# Patient Record
Sex: MALE | Race: WHITE | URBAN - METROPOLITAN AREA
[De-identification: names, ages, dates, MRNs, and addresses within clinical notes are randomized per-mention and may not be internally consistent; named-entity substitution may affect disease eponyms.]

---

## 2017-01-09 ENCOUNTER — OFFICE VISIT (OUTPATIENT)
Dept: NEUROSURGERY | Facility: CLINIC | Age: 52
End: 2017-01-09

## 2017-01-09 VITALS
HEART RATE: 50 BPM | BODY MASS INDEX: 28.04 KG/M2 | HEIGHT: 65 IN | SYSTOLIC BLOOD PRESSURE: 109 MMHG | DIASTOLIC BLOOD PRESSURE: 43 MMHG | WEIGHT: 168.3 LBS

## 2017-01-09 DIAGNOSIS — M96.1 FAILED BACK SYNDROME: Primary | ICD-10-CM

## 2017-01-09 DIAGNOSIS — G89.4 CHRONIC PAIN SYNDROME: ICD-10-CM

## 2017-01-09 RX ORDER — OXYCODONE AND ACETAMINOPHEN 5; 325 MG/1; MG/1
2.5 TABLET ORAL EVERY 4 HOURS PRN
COMMUNITY

## 2017-01-09 RX ORDER — VITAMIN E 268 MG
CAPSULE ORAL EVERY MORNING
COMMUNITY

## 2017-01-09 ASSESSMENT — PAIN SCALES - GENERAL: PAINLEVEL: EXTREME PAIN (8)

## 2017-01-09 NOTE — NURSING NOTE
Chief Complaint   Patient presents with     Consult     P NEW - Dicuss pain pump     Cici Davila MA

## 2017-01-09 NOTE — Clinical Note
1/9/2017       RE: Jani Ibarra  [address] [facility] [address]     Dear Colleague,    Thank you for referring your patient, Jani Ibarra, to the [facility]UROS[facility]ERY at [facility]. Please see a copy of my visit note below.      HISTORY AND PHYSICAL EXAM    Chief Complaint   Patient presents with     Consult     Pain pump evaluation; chronic cervical and low back pain       HISTORY OF PRESENT ILLNESS  We saw Mr. Jani Ibarra in Neurosurgery Clinic today. He is a 51 year old male with history of chronic back and cervical pain for over a year and a half. He has had multiple spine surgeries including an anterior-posterior lumbar fusion with Dr. Richi Ramsey on 07/14/2016. He also has chronic cervical pain, which he gets Toradol injections regularly and takes oxycodone for pain relief. He continues to have debilitating pain that effects his everyday life and is here to discuss an IT pain pump. Today, he reports his oxycodone anymore. He is taking 10 tablets a day. His pain currently in his neck and his back. His back pain worsens with walking. He rates his pain at a constant 8/10. He lists a cervical fusion in 2012, cervical decompression in 2013, another cervical decompression in 2014, and his most recent lumbar fusion in 2016. He had a stimulator trial in the past for his neck, but was unable to access his spine with the electrode. He currently lives in a group home and has been trying to walk as much as he can. He has history of migraines, which he receives botox injections for by a neurologist. He believes he had a neuropsychological evaluation in the past in [place], but does not have the report with him.      History reviewed. No pertinent past medical history.    History reviewed. No pertinent past surgical history.    History reviewed. No pertinent family history.    Social History     Social History     Marital Status: Single     Spouse Name: N/A     Number of  Children: N/A     Years of Education: N/A     Occupational History     Not on file.     Social History Main Topics     Smoking status: Current Every Day Smoker     Smokeless tobacco: Not on file     Alcohol Use: Not on file     Drug Use: Not on file     Sexual Activity: Not on file     Other Topics Concern     Not on file     Social History Narrative     No narrative on file        No Known Allergies    Current Outpatient Prescriptions   Medication     MELOXICAM PO     MIRTAZAPINE PO     OLANZAPINE PO     OMEPRAZOLE PO     OXYCODONE HCL PO     oxyCODONE-acetaminophen (PERCOCET) 5-325 MG per tablet     PRAMIPEXOLE DIHYDROCHLORIDE PO     PREDNISONE PO     PREGABALIN PO     QUETIAPINE FUMARATE PO     RANITIDINE HCL PO     SIMVASTATIN PO     TAMSULOSIN HCL PO     vitamin E 400 UNIT capsule     No current facility-administered medications for this visit.       REVIEW OF SYSTEMS:  General: POSITIVE for difficulty sleeping, headache, recent fatigue, and recent weight gain. Negative for chills/sweats/fever.  Eyes: POSITIVE for double vision. Negative for blurred vision, crossed eyes, recent eye infections, vision flashes, or vision halos.  Ears/Nose/Mouth/Throat: Negative for bleeding gums, difficulty swallowing, earache, ear discharge, hearing loss, hoarseness, nosebleeds, tinnitus, or sinus problems.  Respiratory: POSITIVE for shortness of breath. Negative for chronic cough, coughing blood, night sweats, Tuberculosis, or wheezing.  Cardiovascular: POSITIVE for poor circulation. Negative for chest pain, dyspnea at night, heart murmur, palpitations, pacemaker, pacemaker, poor circulation, swollen legs/feet, or varicose veins.  Gastrointestinal: Negative for melena, hematochezia, chronic diarrhea, heartburn, Hepatitis A/B/C, increasing constipation, Liver Disease, nausea, or vomiting.   Genitourinary: Negative for Urinary retention, genital discharge, urinary incontinence, prostate problems, urgency, or UTI.   Neurological:  "POSITIVE for headaches, numbness and tingling in hands/arms/legs. Negative for syncope, memory problems, or seizures.  Psychological: POSITIVE for anxiety, depression, or restlessness.  Skin: Negative for chronic skin itching, color changes in hand/feet when cold, poor scarring, non-healing ulcers, skin rashes/hives, unusual moles.  Musculoskeletal: POSITIVE for arthritis and osteoporosis. Negative for joint swelling in hands/wrists/hips/knees/joints, muscle tenderness in arms/legs.  Endocrine: Negative for excessive thirst/hunger, intolerance for warm rooms, loss of libido, multiple broken bones, rapid weight gain/loss, galactorrhea, or thyroid issues.  Hematologic/Lymphatic: Negative for easy skin bruising, significant fatigue, prolonged bleeding, tender glands/lymph nodes.  Allergies: Negative for asthma or hay fever.    PHYSICAL EXAM  /43 mmHg  Pulse 50  Ht 1.651 m (5' 5\")  Wt 76.34 kg (168 lb 4.8 oz)  BMI 28.01 kg/m2      General: Awake, alert, oriented. Well nourished, well developed, he is not in any acute distress.  HEENT: Head normocephalic, atraumatic. No carotid bruit. Neck supple. Good range of motion. No palpable thyroid mass.  Heart: Regular rhythm and rate. No murmurs.  Lungs: Clear to auscultation and percussion bilaterally. No ronchi, rales or wheeze.  Abdomen: Soft, non-tender, non-distended. No hepatosplenomegaly.  Extremity: Warm with no clubbing or cyanosis. No lower extremity edema.  Incisions: Lumbar spine midline incision from L1 to S1 well healed.     Neurological  Awake, alert and oriented to date, time, place and person. Speech fluent.   Pupils equal, round, reactive to light.  Extraocular movement intact.  Visual fields are full on confrontation.  Hearing is grossly normal to finger rub.   Facial sensation intact.  Face symmetric.  Tongue midline.  Uvula elevates equally.    Motor: full strength throughout.  Sensation: intact to light touch and pinpoint.  Deep tendon reflexes: " 3+ throughout. Negative for clonus. Negative for Christy's sign. No dysmetria.      ASSESSMENT   51 year old male with history of chronic cervical and lumbar pain. S/p multiple cervical and lumbar spine surgeries.     During today's visit, we discussed both spinal cord stimulator and intrathecal drug delivery system as options for pain management. First, we breifly the spinal cord stimulator implantation surgery. However, he has already had an unsuccessful trial for this and is not a good candidate. Second, we extensively discussed that the intrathecal drug delivery system may be another option for pain management. I explained both phase I and II of the surgery for implanting the IT drug delivery system. The test trial would administer *** to ensure that the patient does not have any adverse side effects to this medication. If he has a successful test trial, he would then return for implantation of a permanent IT drug delivery system.  Risks, benefits, alternative therapies were discussed with the patient, including but not limited to infection and bleeding. All questions were answered, and he expressed understanding.     I have provided him information on both of these surgical interventions for him to review after today's visit. He will return in about 4 weeks to further discuss the pain management options.    A full history and physical exam was performed during today's visit. Regardless of the surgical intervention that the patient pursues, he will need to have a neuropsych evaluation.       PLAN:  1. We will obtain his neuropsychological report for us.  2. We will obtain his imaging of his cervical and lumbar spine from [facility].   3. We will see him back in clinic in *** weeks to finalize surgical recommendations.     I, Solange Sousa, am serving as a scribe to document services personally performed by Cisco Benson MD, PhD, based upon my observations and the provider's statements to me. All  documentation has been reviewed and edited by the aforementioned doctor prior to being entered into the official medical record.    I, Cisco Benson, attest that above named individual is acting in scribe capacity, has observed my performance of the services and has documented them in accordance with my direction. The documentation recorded by the scribe accurately reflects the service I personally performed and the decisions made by me. The document was also partially recorded by me and the entire document was edited by me as well.       Again, thank you for allowing me to participate in the care of your patient.      Sincerely,    Cisco Benson MD

## 2017-01-09 NOTE — LETTER
1/9/2017      RE: Jani Ibarra  [address] [facility] [address]         HISTORY AND PHYSICAL EXAM    Chief Complaint   Patient presents with     Consult     Pain pump evaluation; chronic cervical and low back pain       HISTORY OF PRESENT ILLNESS  We saw Mr. Jani Ibarra in Neurosurgery Clinic today. He is a 51 year old male with history of chronic back and cervical pain for over a year and a half. He has had multiple spine surgeries including an anterior-posterior lumbar fusion with Dr. Richi Ramsey on 07/14/2016. He also has chronic cervical pain, which he gets Toradol injections regularly and takes oxycodone for pain relief. He continues to have debilitating pain that effects his everyday life and is here to discuss an IT pain pump. Today, he reports his oxycodone anymore. He is taking 10 tablets a day. His pain currently in his neck and his back. His back pain worsens with walking. He rates his pain at a constant 8/10. He lists a cervical fusion in 2012, cervical decompression in 2013, another cervical decompression in 2014, and his most recent lumbar fusion in 2016. He had a stimulator trial in the past for his neck, but was unable to access his spine with the electrode. He currently lives in a group home and has been trying to walk as much as he can. He has history of migraines, which he receives botox injections for by a neurologist. He believes he had a neuropsychological evaluation in the past in [place], but does not have the report with him.      History reviewed. No pertinent past medical history.    History reviewed. No pertinent past surgical history.    History reviewed. No pertinent family history.    Social History     Social History     Marital Status: Single     Spouse Name: N/A     Number of Children: N/A     Years of Education: N/A     Occupational History     Not on file.     Social History Main Topics     Smoking status: Current Every Day Smoker     Smokeless tobacco: Not on file      Alcohol Use: Not on file     Drug Use: Not on file     Sexual Activity: Not on file     Other Topics Concern     Not on file     Social History Narrative     No narrative on file        No Known Allergies    Current Outpatient Prescriptions   Medication     MELOXICAM PO     MIRTAZAPINE PO     OLANZAPINE PO     OMEPRAZOLE PO     OXYCODONE HCL PO     oxyCODONE-acetaminophen (PERCOCET) 5-325 MG per tablet     PRAMIPEXOLE DIHYDROCHLORIDE PO     PREDNISONE PO     PREGABALIN PO     QUETIAPINE FUMARATE PO     RANITIDINE HCL PO     SIMVASTATIN PO     TAMSULOSIN HCL PO     vitamin E 400 UNIT capsule     No current facility-administered medications for this visit.       REVIEW OF SYSTEMS:  General: POSITIVE for difficulty sleeping, headache, recent fatigue, and recent weight gain. Negative for chills/sweats/fever.  Eyes: POSITIVE for double vision. Negative for blurred vision, crossed eyes, recent eye infections, vision flashes, or vision halos.  Ears/Nose/Mouth/Throat: Negative for bleeding gums, difficulty swallowing, earache, ear discharge, hearing loss, hoarseness, nosebleeds, tinnitus, or sinus problems.  Respiratory: POSITIVE for shortness of breath. Negative for chronic cough, coughing blood, night sweats, Tuberculosis, or wheezing.  Cardiovascular: POSITIVE for poor circulation. Negative for chest pain, dyspnea at night, heart murmur, palpitations, pacemaker, pacemaker, poor circulation, swollen legs/feet, or varicose veins.  Gastrointestinal: Negative for melena, hematochezia, chronic diarrhea, heartburn, Hepatitis A/B/C, increasing constipation, Liver Disease, nausea, or vomiting.   Genitourinary: Negative for Urinary retention, genital discharge, urinary incontinence, prostate problems, urgency, or UTI.   Neurological: POSITIVE for headaches, numbness and tingling in hands/arms/legs. Negative for syncope, memory problems, or seizures.  Psychological: POSITIVE for anxiety, depression, or restlessness.  Skin:  "Negative for chronic skin itching, color changes in hand/feet when cold, poor scarring, non-healing ulcers, skin rashes/hives, unusual moles.  Musculoskeletal: POSITIVE for arthritis and osteoporosis. Negative for joint swelling in hands/wrists/hips/knees/joints, muscle tenderness in arms/legs.  Endocrine: Negative for excessive thirst/hunger, intolerance for warm rooms, loss of libido, multiple broken bones, rapid weight gain/loss, galactorrhea, or thyroid issues.  Hematologic/Lymphatic: Negative for easy skin bruising, significant fatigue, prolonged bleeding, tender glands/lymph nodes.  Allergies: Negative for asthma or hay fever.    PHYSICAL EXAM  /43 mmHg  Pulse 50  Ht 1.651 m (5' 5\")  Wt 76.34 kg (168 lb 4.8 oz)  BMI 28.01 kg/m2      General: Awake, alert, oriented. Well nourished, well developed, he is not in any acute distress.  HEENT: Head normocephalic, atraumatic. No carotid bruit. Neck supple. Good range of motion. No palpable thyroid mass.  Heart: Regular rhythm and rate. No murmurs.  Lungs: Clear to auscultation and percussion bilaterally. No ronchi, rales or wheeze.  Abdomen: Soft, non-tender, non-distended. No hepatosplenomegaly.  Extremity: Warm with no clubbing or cyanosis. No lower extremity edema.  Incisions: Lumbar spine midline incision from L1 to S1 well healed.     Neurological  Awake, alert and oriented to date, time, place and person. Speech fluent.   Pupils equal, round, reactive to light.  Extraocular movement intact.  Visual fields are full on confrontation.  Hearing is grossly normal to finger rub.   Facial sensation intact.  Face symmetric.  Tongue midline.  Uvula elevates equally.    Motor: full strength throughout.  Sensation: intact to light touch and pinpoint.  Deep tendon reflexes: 3+ throughout. Negative for clonus. Negative for Christy's sign. No dysmetria.      ASSESSMENT   51 year old male with history of chronic cervical and lumbar pain. S/p multiple cervical and " lumbar spine surgeries.     During today's visit, we discussed both spinal cord stimulator and intrathecal drug delivery system as options for pain management. First, we breifly the spinal cord stimulator implantation surgery. However, he has already had an unsuccessful trial for this and is not a good candidate. Second, we extensively discussed that the intrathecal drug delivery system may be another option for pain management. I explained both phase I and II of the surgery for implanting the IT drug delivery system. The test trial would administer *** to ensure that the patient does not have any adverse side effects to this medication. If he has a successful test trial, he would then return for implantation of a permanent IT drug delivery system.  Risks, benefits, alternative therapies were discussed with the patient, including but not limited to infection and bleeding. All questions were answered, and he expressed understanding.     I have provided him information on both of these surgical interventions for him to review after today's visit. He will return in about 4 weeks to further discuss the pain management options.    A full history and physical exam was performed during today's visit. Regardless of the surgical intervention that the patient pursues, he will need to have a neuropsych evaluation.       PLAN:  1. We will obtain his neuropsychological report for us.  2. We will obtain his imaging of his cervical and lumbar spine from [facility].   3. We will see him back in clinic in *** weeks to finalize surgical recommendations.     I, Solange Sousa, am serving as a scribe to document services personally performed by Cisco Benson MD, PhD, based upon my observations and the provider's statements to me. All documentation has been reviewed and edited by the aforementioned doctor prior to being entered into the official medical record.    I, Cisco Benson, attest that above named individual is acting in  scribe capacity, has observed my performance of the services and has documented them in accordance with my direction. The documentation recorded by the scribe accurately reflects the service I personally performed and the decisions made by me. The document was also partially recorded by me and the entire document was edited by me as well.       Cisco Benson MD

## 2017-01-09 NOTE — MR AVS SNAPSHOT
"              After Visit Summary   2017    Jani Ibarra    MRN: [id number]           Patient Information     Date Of Birth          1965        Visit Information        Provider Department      2017 3:00 PM Cisco Benson MD [facility] Neurosurgery        Today's Diagnoses     Failed back syndrome    -  1    Chronic pain syndrome           Follow-ups after your visit        Who to contact     Please call your clinic at [phone] to:    Ask questions about your health    Make or cancel appointments    Discuss your medicines    Learn about your test results    Speak to your doctor   If you have compliments or concerns about an experience at your clinic, or if you wish to file a complaint, please contact [facility] Patient Relations at [phone] or email us at [email or web address]         Additional Information About Your Visit        MyChart Information     [employer] is an electronic gateway that provides easy, online access to your medical records. With [employer], you can request a clinic appointment, read your test results, renew a prescription or communicate with your care team.     To sign up for [employer] visit the website at [email or web address]   You will be asked to enter the access code listed below, as well as some personal information. Please follow the directions to create your username and password.     Your access code is: [email or web address]  Expires: 2017 11:15 AM     Your access code will  in 90 days. If you need help or a new code, please contact your [facility] or call [phone] for assistance.        Care EveryWhere ID     This is your Care EveryWhere ID. This could be used by other organizations to access your [facility] medical records  [id number]        Your Vitals Were     Pulse Height BMI (Body Mass Index)             50 1.651 m (5' 5\") 28.01 kg/m2          Blood Pressure from Last 3 " Encounters:   03/13/17 (!) 177/97   01/09/17 109/43    Weight from Last 3 Encounters:   03/13/17 81 kg (178 lb 9.6 oz)   01/09/17 76.3 kg (168 lb 4.8 oz)              Today, you had the following     No orders found for display       Primary Care Provider    None Specified       No primary provider on file.        Thank you!     Thank you for choosing [facility]  for your care. Our goal is always to provide you with excellent care. Hearing back from our patients is one way we can continue to improve our services. Please take a few minutes to complete the written survey that you may receive in the mail after your visit with us. Thank you!             Your Updated Medication List - Protect others around you: Learn how to safely use, store and throw away your medicines at www.disposemymeds.org.          This list is accurate as of: 1/9/17 11:59 PM.  Always use your most recent med list.                   Brand Name Dispense Instructions for use    MELOXICAM PO      Take 7.5 mg by mouth 2 times daily       MIRTAZAPINE PO      Take 30 mg by mouth At Bedtime       OLANZAPINE PO      Take 5 mg by mouth At Bedtime       OMEPRAZOLE PO      Take 20 mg by mouth 2 times daily       OXYCODONE HCL PO          oxyCODONE-acetaminophen 5-325 MG per tablet    PERCOCET     Take 2.5 tablets by mouth every 4 hours as needed for moderate to severe pain       PRAMIPEXOLE DIHYDROCHLORIDE PO      Take 0.25 mg by mouth 2 times daily       PREDNISONE PO      Take 10 mg by mouth daily       PREGABALIN PO      Take 300 mg by mouth 2 times daily       QUETIAPINE FUMARATE PO      Take 50 mg by mouth At Bedtime       RANITIDINE HCL PO      Take 150 mg by mouth daily       SIMVASTATIN PO      Take 40 mg by mouth At Bedtime       TAMSULOSIN HCL PO      Take 0.8 mg by mouth daily       vitamin E 400 UNIT capsule      Take by mouth every morning

## 2017-01-09 NOTE — PROGRESS NOTES
HISTORY AND PHYSICAL EXAM    Chief Complaint   Patient presents with     Consult     Pain pump evaluation; chronic cervical and low back pain       HISTORY OF PRESENT ILLNESS  We saw Mr. Jani Ibarra in [facility]surgery [facility] today. He is a 51 year old male with history of chronic back and cervical pain for over a year and a half. He has had multiple spine surgeries including an anterior-posterior lumbar fusion with Dr. Richi Ramsey on 07/14/2016. He also has chronic cervical pain, which he gets Toradol injections regularly and takes oxycodone for pain relief. He continues to have debilitating pain that effects his everyday life and is here to discuss an IT pain pump. Today, he reports his oxycodone anymore. He is taking 10 tablets a day. His pain currently in his neck and his back. His back pain worsens with walking. He rates his pain at a constant 8/10. He lists a cervical fusion in 2012, cervical decompression in 2013, another cervical decompression in 2014, and his most recent lumbar fusion in 2016. He had a stimulator trial in the past for his neck, but was unable to access his spine with the electrode. He currently lives in a group home and has been trying to walk as much as he can. He has history of migraines, which he receives botox injections for by a neurologist. He believes he had a neuropsychological evaluation in the past in [place], but does not have the report with him.      Past Medical History:   Diagnosis Date     Chronic pain syndrome     multiple spine surgeries     Depressive disorder      Iron deficiency anemia, unspecified        Past Surgical History:   Procedure Laterality Date     BACK SURGERY  07/14/2016    Anterior-Posterior lumbar fusion with Dr. Richi Ramsey     C CERV SPINE FUSN,ANTER,BELOW C2  2012    ACDF C3 to C7     C LAMINECTOMY,>2 SGMT,CERVICAL  2014    Posterior cervical decompression       History reviewed. No pertinent family history.    Social History     Social History      Marital status: Single     Spouse name: N/A     Number of children: N/A     Years of education: N/A     Occupational History     Not on file.     Social History Main Topics     Smoking status: Current Every Day Smoker     Smokeless tobacco: Not on file     Alcohol use Not on file     Drug use: Not on file     Sexual activity: Not on file     Other Topics Concern     Not on file     Social History Narrative        No Known Allergies    Current Outpatient Prescriptions   Medication     MELOXICAM PO     MIRTAZAPINE PO     OLANZAPINE PO     OMEPRAZOLE PO     OXYCODONE HCL PO     oxyCODONE-acetaminophen (PERCOCET) 5-325 MG per tablet     PRAMIPEXOLE DIHYDROCHLORIDE PO     PREDNISONE PO     PREGABALIN PO     QUETIAPINE FUMARATE PO     RANITIDINE HCL PO     SIMVASTATIN PO     TAMSULOSIN HCL PO     vitamin E 400 UNIT capsule     No current facility-administered medications for this visit.        REVIEW OF SYSTEMS:  General: POSITIVE for difficulty sleeping, headache, recent fatigue, and recent weight gain. Negative for chills/sweats/fever.  Eyes: POSITIVE for double vision. Negative for blurred vision, crossed eyes, recent eye infections, vision flashes, or vision halos.  Ears/Nose/Mouth/Throat: Negative for bleeding gums, difficulty swallowing, earache, ear discharge, hearing loss, hoarseness, nosebleeds, tinnitus, or sinus problems.  Respiratory: POSITIVE for shortness of breath. Negative for chronic cough, coughing blood, night sweats, Tuberculosis, or wheezing.  Cardiovascular: POSITIVE for poor circulation. Negative for chest pain, dyspnea at night, heart murmur, palpitations, pacemaker, pacemaker, poor circulation, swollen legs/feet, or varicose veins.  Gastrointestinal: Negative for melena, hematochezia, chronic diarrhea, heartburn, Hepatitis A/B/C, increasing constipation, Liver Disease, nausea, or vomiting.   Genitourinary: Negative for Urinary retention, genital discharge, urinary incontinence, prostate problems,  "urgency, or UTI.   Neurological: POSITIVE for headaches, numbness and tingling in hands/arms/legs. Negative for syncope, memory problems, or seizures.  Psychological: POSITIVE for anxiety, depression, or restlessness.  Skin: Negative for chronic skin itching, color changes in hand/feet when cold, poor scarring, non-healing ulcers, skin rashes/hives, unusual moles.  Musculoskeletal: POSITIVE for arthritis and osteoporosis. Negative for joint swelling in hands/wrists/hips/knees/joints, muscle tenderness in arms/legs.  Endocrine: Negative for excessive thirst/hunger, intolerance for warm rooms, loss of libido, multiple broken bones, rapid weight gain/loss, galactorrhea, or thyroid issues.  Hematologic/Lymphatic: Negative for easy skin bruising, significant fatigue, prolonged bleeding, tender glands/lymph nodes.  Allergies: Negative for asthma or hay fever.      PHYSICAL EXAM  /43  Pulse 50  Ht 1.651 m (5' 5\")  Wt 76.3 kg (168 lb 4.8 oz)  BMI 28.01 kg/m2    General: Awake, alert, oriented. Well nourished, well developed, he is not in any acute distress.  HEENT: Head normocephalic, atraumatic. No carotid bruit. Neck supple. Good range of motion. No palpable thyroid mass.  Heart: Regular rhythm and rate. No murmurs.  Lungs: Clear to auscultation and percussion bilaterally. No ronchi, rales or wheeze.  Abdomen: Soft, non-tender, non-distended. No hepatosplenomegaly.  Extremity: Warm with no clubbing or cyanosis. No lower extremity edema.  Incisions: Lumbar spine midline incision from L1 to S1 well healed.     Neurological  Awake, alert and oriented to date, time, place and person. Speech fluent.   Pupils equal, round, reactive to light.  Extraocular movement intact.  Visual fields are full on confrontation.  Hearing is grossly normal to finger rub.   Facial sensation intact.  Face symmetric.  Tongue midline.  Uvula elevates equally.    Motor: full strength throughout.  Sensation: intact to light touch and " pinpoint.  Deep tendon reflexes: 3+ throughout. Negative for clonus. Negative for Christy's sign. No dysmetria.      ASSESSMENT   51 year old male with history of chronic cervical and lumbar pain. S/p multiple cervical and lumbar spine surgeries. 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    During today's visit, we discussed both spinal cord stimulator and intrathecal drug delivery system as options for pain management.     First, we breifly the spinal cord stimulator implantation surgery. However, he has already had an unsuccessful trial for this and may not be a good candidate. There was a difficulty in placing the electrode. I do not have a record of this but if attempt was made, I'm not sure if he could undergo another attempt.  Given that he had a posterior cervical decompression and extensive lumbar decompression and fusion, placement of electrode in the cervical spine may not be possible. Placement in the thoracic spine may be ok with thoracic laminectomy.    Second, we extensively discussed that the intrathecal drug delivery system may be another option for pain management. I explained both phase I and II of the surgery for implanting the IT drug delivery system. The test trial would administer morphine usually to ensure that the patient does not have any adverse side effects to this medication. If he has a successful test trial, he would then return for implantation of a permanent IT drug delivery system.  Risks, benefits, alternative therapies were discussed with the patient, including but not limited to infection and bleeding. All questions were answered, and he expressed understanding.     I did express the overall concern regarding pursuing the pump option. He does live near [place]. He also states that he does not have a relationship with a pain management physician who can manage the pump.  This would make the logistics of managing the pain  pump difficult, as it would require frequent and close adjustment and management.    I have provided him information on both of these surgical interventions for him to review after today's visit. He will return in about 4 weeks to further discuss the pain management options.    A full history and physical exam was performed during today's visit. Regardless of the surgical intervention that the patient pursues, he will need to have a neuropsych evaluation.       PLAN:  1. We will obtain his neuropsychological report for us.  2. We will obtain his imaging of his cervical and lumbar spine from [facility].   3. We will see him back in clinic in about 6 weeks to finalize surgical recommendations.     I, Solange Sousa, am serving as a scribe to document services personally performed by Cisco Benson MD, PhD, based upon my observations and the provider's statements to me. All documentation has been reviewed and edited by the aforementioned doctor prior to being entered into the official medical record.    I, Cisco Benson, attest that above named individual is acting in scribe capacity, has observed my performance of the services and has documented them in accordance with my direction. The documentation recorded by the scribe accurately reflects the service I personally performed and the decisions made by me. The document was also partially recorded by me and the entire document was edited by me as well.

## 2017-01-09 NOTE — LETTER
1/9/2017       RE: Jani Ibarra  [address] [facility] [address]     Dear Colleague,    Thank you for referring your patient, Jani Ibarra, to the [facility] NEUROSURGERY at [facility]. Please see a copy of my visit note below.      HISTORY AND PHYSICAL EXAM    Chief Complaint   Patient presents with     Consult     Pain pump evaluation; chronic cervical and low back pain       HISTORY OF PRESENT ILLNESS  We saw Mr. Jani Ibarra in Neurosurgery Clinic today. He is a 51 year old male with history of chronic back and cervical pain for over a year and a half. He has had multiple spine surgeries including an anterior-posterior lumbar fusion with Dr. Richi Ramsey on 07/14/2016. He also has chronic cervical pain, which he gets Toradol injections regularly and takes oxycodone for pain relief. He continues to have debilitating pain that effects his everyday life and is here to discuss an IT pain pump. Today, he reports his oxycodone anymore. He is taking 10 tablets a day. His pain currently in his neck and his back. His back pain worsens with walking. He rates his pain at a constant 8/10. He lists a cervical fusion in 2012, cervical decompression in 2013, another cervical decompression in 2014, and his most recent lumbar fusion in 2016. He had a stimulator trial in the past for his neck, but was unable to access his spine with the electrode. He currently lives in a group home and has been trying to walk as much as he can. He has history of migraines, which he receives botox injections for by a neurologist. He believes he had a neuropsychological evaluation in the past in [place], but does not have the report with him.      History reviewed. No pertinent past medical history.    History reviewed. No pertinent past surgical history.    History reviewed. No pertinent family history.    Social History     Social History     Marital Status: Single     Spouse Name: N/A     Number of  Children: N/A     Years of Education: N/A     Occupational History     Not on file.     Social History Main Topics     Smoking status: Current Every Day Smoker     Smokeless tobacco: Not on file     Alcohol Use: Not on file     Drug Use: Not on file     Sexual Activity: Not on file     Other Topics Concern     Not on file     Social History Narrative     No narrative on file        No Known Allergies    Current Outpatient Prescriptions   Medication     MELOXICAM PO     MIRTAZAPINE PO     OLANZAPINE PO     OMEPRAZOLE PO     OXYCODONE HCL PO     oxyCODONE-acetaminophen (PERCOCET) 5-325 MG per tablet     PRAMIPEXOLE DIHYDROCHLORIDE PO     PREDNISONE PO     PREGABALIN PO     QUETIAPINE FUMARATE PO     RANITIDINE HCL PO     SIMVASTATIN PO     TAMSULOSIN HCL PO     vitamin E 400 UNIT capsule     No current facility-administered medications for this visit.       REVIEW OF SYSTEMS:  General: POSITIVE for difficulty sleeping, headache, recent fatigue, and recent weight gain. Negative for chills/sweats/fever.  Eyes: POSITIVE for double vision. Negative for blurred vision, crossed eyes, recent eye infections, vision flashes, or vision halos.  Ears/Nose/Mouth/Throat: Negative for bleeding gums, difficulty swallowing, earache, ear discharge, hearing loss, hoarseness, nosebleeds, tinnitus, or sinus problems.  Respiratory: POSITIVE for shortness of breath. Negative for chronic cough, coughing blood, night sweats, Tuberculosis, or wheezing.  Cardiovascular: POSITIVE for poor circulation. Negative for chest pain, dyspnea at night, heart murmur, palpitations, pacemaker, pacemaker, poor circulation, swollen legs/feet, or varicose veins.  Gastrointestinal: Negative for melena, hematochezia, chronic diarrhea, heartburn, Hepatitis A/B/C, increasing constipation, Liver Disease, nausea, or vomiting.   Genitourinary: Negative for Urinary retention, genital discharge, urinary incontinence, prostate problems, urgency, or UTI.   Neurological:  "POSITIVE for headaches, numbness and tingling in hands/arms/legs. Negative for syncope, memory problems, or seizures.  Psychological: POSITIVE for anxiety, depression, or restlessness.  Skin: Negative for chronic skin itching, color changes in hand/feet when cold, poor scarring, non-healing ulcers, skin rashes/hives, unusual moles.  Musculoskeletal: POSITIVE for arthritis and osteoporosis. Negative for joint swelling in hands/wrists/hips/knees/joints, muscle tenderness in arms/legs.  Endocrine: Negative for excessive thirst/hunger, intolerance for warm rooms, loss of libido, multiple broken bones, rapid weight gain/loss, galactorrhea, or thyroid issues.  Hematologic/Lymphatic: Negative for easy skin bruising, significant fatigue, prolonged bleeding, tender glands/lymph nodes.  Allergies: Negative for asthma or hay fever.    PHYSICAL EXAM  /43 mmHg  Pulse 50  Ht 1.651 m (5' 5\")  Wt 76.34 kg (168 lb 4.8 oz)  BMI 28.01 kg/m2      General: Awake, alert, oriented. Well nourished, well developed, he is not in any acute distress.  HEENT: Head normocephalic, atraumatic. No carotid bruit. Neck supple. Good range of motion. No palpable thyroid mass.  Heart: Regular rhythm and rate. No murmurs.  Lungs: Clear to auscultation and percussion bilaterally. No ronchi, rales or wheeze.  Abdomen: Soft, non-tender, non-distended. No hepatosplenomegaly.  Extremity: Warm with no clubbing or cyanosis. No lower extremity edema.  Incisions: Lumbar spine midline incision from L1 to S1 well healed.     Neurological  Awake, alert and oriented to date, time, place and person. Speech fluent.   Pupils equal, round, reactive to light.  Extraocular movement intact.  Visual fields are full on confrontation.  Hearing is grossly normal to finger rub.   Facial sensation intact.  Face symmetric.  Tongue midline.  Uvula elevates equally.    Motor: full strength throughout.  Sensation: intact to light touch and pinpoint.  Deep tendon reflexes: " 3+ throughout. Negative for clonus. Negative for Christy's sign. No dysmetria.      ASSESSMENT   51 year old male with history of chronic cervical and lumbar pain. S/p multiple cervical and lumbar spine surgeries.     During today's visit, we discussed both spinal cord stimulator and intrathecal drug delivery system as options for pain management. First, we breifly the spinal cord stimulator implantation surgery. However, he has already had an unsuccessful trial for this and is not a good candidate. Second, we extensively discussed that the intrathecal drug delivery system may be another option for pain management. I explained both phase I and II of the surgery for implanting the IT drug delivery system. The test trial would administer *** to ensure that the patient does not have any adverse side effects to this medication. If he has a successful test trial, he would then return for implantation of a permanent IT drug delivery system.  Risks, benefits, alternative therapies were discussed with the patient, including but not limited to infection and bleeding. All questions were answered, and he expressed understanding.     I have provided him information on both of these surgical interventions for him to review after today's visit. He will return in about 4 weeks to further discuss the pain management options.    A full history and physical exam was performed during today's visit. Regardless of the surgical intervention that the patient pursues, he will need to have a neuropsych evaluation.       PLAN:  1. We will obtain his neuropsychological report for us.  2. We will obtain his imaging of his cervical and lumbar spine from [facility].   3. We will see him back in clinic in *** weeks to finalize surgical recommendations.     I, Solange Sousa, am serving as a scribe to document services personally performed by Cisco Benson MD, PhD, based upon my observations and the provider's statements to me. All  documentation has been reviewed and edited by the aforementioned doctor prior to being entered into the official medical record.    I, Cisco Benson, attest that above named individual is acting in scribe capacity, has observed my performance of the services and has documented them in accordance with my direction. The documentation recorded by the scribe accurately reflects the service I personally performed and the decisions made by me. The document was also partially recorded by me and the entire document was edited by me as well.       Again, thank you for allowing me to participate in the care of your patient.      Sincerely,    Cisco Benson MD

## 2017-01-19 ENCOUNTER — TRANSFERRED RECORDS (OUTPATIENT)
Dept: HEALTH INFORMATION MANAGEMENT | Facility: CLINIC | Age: 52
End: 2017-01-19

## 2017-02-03 ENCOUNTER — TELEPHONE (OUTPATIENT)
Dept: NEUROSURGERY | Facility: CLINIC | Age: 52
End: 2017-02-03

## 2017-02-03 NOTE — TELEPHONE ENCOUNTER
Pt called b/c he wants to schedule f/u with Dr. Benson to discuss pain pump implant. I asked him if he had signed a release of information so we can get his psychological evaluation from [facility]. He has not. I explained that we need this to move forward. He said he would go there on Monday to sign the release. I gave him our clinic address and fax number so the records can be sent over. No further questions at this time.

## 2017-03-13 ENCOUNTER — OFFICE VISIT (OUTPATIENT)
Dept: NEUROSURGERY | Facility: CLINIC | Age: 52
End: 2017-03-13

## 2017-03-13 VITALS
HEART RATE: 61 BPM | SYSTOLIC BLOOD PRESSURE: 177 MMHG | BODY MASS INDEX: 29.76 KG/M2 | HEIGHT: 65 IN | DIASTOLIC BLOOD PRESSURE: 97 MMHG | WEIGHT: 178.6 LBS

## 2017-03-13 DIAGNOSIS — M54.6 CHRONIC BILATERAL THORACIC BACK PAIN: ICD-10-CM

## 2017-03-13 DIAGNOSIS — M54.2 CERVICALGIA: Primary | ICD-10-CM

## 2017-03-13 DIAGNOSIS — G89.29 CHRONIC BILATERAL THORACIC BACK PAIN: ICD-10-CM

## 2017-03-13 ASSESSMENT — PAIN SCALES - GENERAL: PAINLEVEL: EXTREME PAIN (8)

## 2017-03-13 NOTE — LETTER
3/13/2017       RE: Jani Ibarra  [address] [place] [id number]     Dear Colleague,    Thank you for referring your patient, Jani Ibarra, to the [facility] NEUROSURGERY at [facility]. Please see a copy of my visit note below.    HISTORY AND PHYSICAL EXAM    Chief Complaint   Patient presents with     RECHECK     Discuss Pain Pump; chronic cervical and back pain       HISTORY OF PRESENT ILLNESS  We saw Mr. Jani Ibarra back in Neurosurgery Clinic today to discuss his pain. He is a 51 year old male with history of chronic back and cervical pain for over a year and a half. He has had multiple spine surgeries including an anterior-posterior lumbar fusion with Dr. Richi Ramsey on 07/14/2016. He also has chronic cervical pain, which he gets Toradol injections regularly and takes oxycodone for pain relief. He continues to have debilitating pain that affects his everyday life and is here to discuss an IT pain pump. He lists a cervical fusion in 2012, cervical decompression in 2013, another cervical decompression in 2014, and his most recent lumbar fusion in 2016. He had a stimulator trial in the past for his neck, but was unable to access his spine with the electrode. He is back to discuss his surgical options. Today, he is visibly in pain and not doing well. His primary care physician is currently the one prescribing him pain medication. He does have an appointment with Dr. Ramsey next month. He also has chronic neuropathy in his hands and feet. His legs and arms are numb, making it extremely difficult for him to be active. He has chronic insomnia, where he only gets 2-3 hours of sleep a night.         Past Medical History:   Diagnosis Date     Chronic pain syndrome     multiple spine surgeries     Depressive disorder      Iron deficiency anemia, unspecified        Past Surgical History:   Procedure Laterality Date     BACK SURGERY  07/14/2016    Anterior-Posterior lumbar fusion  with Dr. Richi Ramsey     C CERV SPINE FUSN,ANTER,BELOW C2  2012    ACDF C3 to C7     C LAMINECTOMY,>2 SGMT,CERVICAL  2014    Posterior cervical decompression       History reviewed. No pertinent family history.    Social History     Social History     Marital status: Single     Spouse name: N/A     Number of children: N/A     Years of education: N/A     Occupational History     Not on file.     Social History Main Topics     Smoking status: Current Every Day Smoker     Smokeless tobacco: Not on file     Alcohol use Not on file     Drug use: Not on file     Sexual activity: Not on file     Other Topics Concern     Not on file     Social History Narrative        No Known Allergies    Current Outpatient Prescriptions   Medication     MELOXICAM PO     MIRTAZAPINE PO     OLANZAPINE PO     OMEPRAZOLE PO     OXYCODONE HCL PO     oxyCODONE-acetaminophen (PERCOCET) 5-325 MG per tablet     PRAMIPEXOLE DIHYDROCHLORIDE PO     PREDNISONE PO     PREGABALIN PO     QUETIAPINE FUMARATE PO     RANITIDINE HCL PO     SIMVASTATIN PO     TAMSULOSIN HCL PO     vitamin E 400 UNIT capsule     No current facility-administered medications for this visit.        REVIEW OF SYSTEMS:  General: POSITIVE for difficulty sleeping, headache, recent fatigue, and recent weight gain. Negative for chills/sweats/fever.  Eyes: POSITIVE for double vision. Negative for blurred vision, crossed eyes, recent eye infections, vision flashes, or vision halos.  Ears/Nose/Mouth/Throat: Negative for bleeding gums, difficulty swallowing, earache, ear discharge, hearing loss, hoarseness, nosebleeds, tinnitus, or sinus problems.  Respiratory: POSITIVE for shortness of breath. Negative for chronic cough, coughing blood, night sweats, Tuberculosis, or wheezing.  Cardiovascular: POSITIVE for poor circulation. Negative for chest pain, dyspnea at night, heart murmur, palpitations, pacemaker, pacemaker, poor circulation, swollen legs/feet, or varicose  "veins.  Gastrointestinal: Negative for melena, hematochezia, chronic diarrhea, heartburn, Hepatitis A/B/C, increasing constipation, Liver Disease, nausea, or vomiting.   Genitourinary: Negative for Urinary retention, genital discharge, urinary incontinence, prostate problems, urgency, or UTI.   Neurological: POSITIVE for headaches, numbness and tingling in hands/arms/legs. Negative for syncope, memory problems, or seizures.  Psychological: POSITIVE for anxiety, depression, or restlessness.  Skin: Negative for chronic skin itching, color changes in hand/feet when cold, poor scarring, non-healing ulcers, skin rashes/hives, unusual moles.  Musculoskeletal: POSITIVE for arthritis and osteoporosis. Negative for joint swelling in hands/wrists/hips/knees/joints, muscle tenderness in arms/legs.  Endocrine: Negative for excessive thirst/hunger, intolerance for warm rooms, loss of libido, multiple broken bones, rapid weight gain/loss, galactorrhea, or thyroid issues.  Hematologic/Lymphatic: Negative for easy skin bruising, significant fatigue, prolonged bleeding, tender glands/lymph nodes.  Allergies: Negative for asthma or hay fever.  PHYSICAL EXAM  BP (!) 177/97 (BP Location: Left arm, Patient Position: Chair, Cuff Size: Adult Large)  Pulse 61  Ht 1.651 m (5' 5\")  Wt 81 kg (178 lb 9.6 oz)  BMI 29.72 kg/m2    General: Awake, alert, oriented. Well nourished, well developed, he is not in any acute distress.  Incisions: Lumbar spine midline incision from L1 to S1 well healed.    Neurological  Awake, alert and oriented to date, time, place and person. Speech fluent.   Pupils equal, round, reactive to light.  Extraocular movement intact.  Facial sensation intact.  Face symmetric.  Moves all extremities well.  Sensation grossly intact.    ASSESSMENT  51 year old male with history of chronic cervical and lumbar pain. S/p multiple cervical and lumbar spine surgeries.    He returns to discuss his options further. We do not have " "his neuropsychological report and although he had his imaging done, we do not have it to review.    We again discussed his option of the intrathecal pump placement.  I again expressed my concerns for this option.  The main concern is that he lives near the [facility]. If we were to place a pump, his management would be difficult, as he would have to make frequent trips down to [place] for adjustments and refills.  Although he states that he is willing to do so, I question the feasibility and the limited options of days that he can be seen.    I also discussed the overall pain management service issues.  Unless he has a pain management physician or service who is willing to manage his pump and his medication, I told him that it would be difficult for me to take him on as a patient to continue to manage. I explained to him the differences in the role of an implanting surgeon versus a pain management physician.  I also explained that I do have some patients that I manage but that is due to the lack of pain management service at this time who can take over the management.  Adding to the fact that he would be a \"long distance\" patient, I explained that it would not be a good idea or a safe idea to go forward with the implantation.    I explained that if he is able to establish a relationship with a pain management physician close to his home and he requires the surgical services of implanting the pump, I could be available for this.      PLAN  1. MRI of the cervical and thoracic spine - either obtain or get the images.  2. We will await for neuropsych report.  3. Awaiting other medical records.  4. Patient will let us know if he has established a relationship with a pain management physician near his home who is willing to manage a intrathecal pump.    I, Solange Sousa, am serving as a scribe to document services personally performed by Cisco Benson MD, PhD, based upon my observations and the provider's " statements to me. All documentation has been reviewed and edited by the aforementioned doctor prior to being entered into the official medical record.    I, Cisco Benson, attest that above named individual is acting in scribe capacity, has observed my performance of the services and has documented them in accordance with my direction. The documentation recorded by the scribe accurately reflects the service I personally performed and the decisions made by me. The document was also partially recorded by me and the entire document was edited by me as well.         Again, thank you for allowing me to participate in the care of your patient.      Sincerely,    Cisco Benson MD

## 2017-03-13 NOTE — PROGRESS NOTES
HISTORY AND PHYSICAL EXAM    Chief Complaint   Patient presents with     RECHECK     Discuss Pain Pump; chronic cervical and back pain       HISTORY OF PRESENT ILLNESS  We saw Mr. Jani Ibarra back in Neurosurgery Clinic today to discuss his pain. He is a 51 year old male with history of chronic back and cervical pain for over a year and a half. He has had multiple spine surgeries including an anterior-posterior lumbar fusion with Dr. Richi Ramsey on 07/14/2016. He also has chronic cervical pain, which he gets Toradol injections regularly and takes oxycodone for pain relief. He continues to have debilitating pain that affects his everyday life and is here to discuss an IT pain pump. He lists a cervical fusion in 2012, cervical decompression in 2013, another cervical decompression in 2014, and his most recent lumbar fusion in 2016. He had a stimulator trial in the past for his neck, but was unable to access his spine with the electrode. He is back to discuss his surgical options. Today, he is visibly in pain and not doing well. His primary care physician is currently the one prescribing him pain medication. He does have an appointment with Dr. Ramsey next month. He also has chronic neuropathy in his hands and feet. His legs and arms are numb, making it extremely difficult for him to be active. He has chronic insomnia, where he only gets 2-3 hours of sleep a night.         Past Medical History:   Diagnosis Date     Chronic pain syndrome     multiple spine surgeries     Depressive disorder      Iron deficiency anemia, unspecified        Past Surgical History:   Procedure Laterality Date     BACK SURGERY  07/14/2016    Anterior-Posterior lumbar fusion with Dr. Richi Ramsey     C CERV SPINE FUSN,ANTER,BELOW C2  2012    ACDF C3 to C7     C LAMINECTOMY,>2 SGMT,CERVICAL  2014    Posterior cervical decompression       History reviewed. No pertinent family history.    Social History     Social History     Marital status:  Single     Spouse name: N/A     Number of children: N/A     Years of education: N/A     Occupational History     Not on file.     Social History Main Topics     Smoking status: Current Every Day Smoker     Smokeless tobacco: Not on file     Alcohol use Not on file     Drug use: Not on file     Sexual activity: Not on file     Other Topics Concern     Not on file     Social History Narrative        No Known Allergies    Current Outpatient Prescriptions   Medication     MELOXICAM PO     MIRTAZAPINE PO     OLANZAPINE PO     OMEPRAZOLE PO     OXYCODONE HCL PO     oxyCODONE-acetaminophen (PERCOCET) 5-325 MG per tablet     PRAMIPEXOLE DIHYDROCHLORIDE PO     PREDNISONE PO     PREGABALIN PO     QUETIAPINE FUMARATE PO     RANITIDINE HCL PO     SIMVASTATIN PO     TAMSULOSIN HCL PO     vitamin E 400 UNIT capsule     No current facility-administered medications for this visit.        REVIEW OF SYSTEMS:  General: POSITIVE for difficulty sleeping, headache, recent fatigue, and recent weight gain. Negative for chills/sweats/fever.  Eyes: POSITIVE for double vision. Negative for blurred vision, crossed eyes, recent eye infections, vision flashes, or vision halos.  Ears/Nose/Mouth/Throat: Negative for bleeding gums, difficulty swallowing, earache, ear discharge, hearing loss, hoarseness, nosebleeds, tinnitus, or sinus problems.  Respiratory: POSITIVE for shortness of breath. Negative for chronic cough, coughing blood, night sweats, Tuberculosis, or wheezing.  Cardiovascular: POSITIVE for poor circulation. Negative for chest pain, dyspnea at night, heart murmur, palpitations, pacemaker, pacemaker, poor circulation, swollen legs/feet, or varicose veins.  Gastrointestinal: Negative for melena, hematochezia, chronic diarrhea, heartburn, Hepatitis A/B/C, increasing constipation, Liver Disease, nausea, or vomiting.   Genitourinary: Negative for Urinary retention, genital discharge, urinary incontinence, prostate problems, urgency, or UTI.  "  Neurological: POSITIVE for headaches, numbness and tingling in hands/arms/legs. Negative for syncope, memory problems, or seizures.  Psychological: POSITIVE for anxiety, depression, or restlessness.  Skin: Negative for chronic skin itching, color changes in hand/feet when cold, poor scarring, non-healing ulcers, skin rashes/hives, unusual moles.  Musculoskeletal: POSITIVE for arthritis and osteoporosis. Negative for joint swelling in hands/wrists/hips/knees/joints, muscle tenderness in arms/legs.  Endocrine: Negative for excessive thirst/hunger, intolerance for warm rooms, loss of libido, multiple broken bones, rapid weight gain/loss, galactorrhea, or thyroid issues.  Hematologic/Lymphatic: Negative for easy skin bruising, significant fatigue, prolonged bleeding, tender glands/lymph nodes.  Allergies: Negative for asthma or hay fever.      PHYSICAL EXAM  BP (!) 177/97 (BP Location: Left arm, Patient Position: Chair, Cuff Size: Adult Large)  Pulse 61  Ht 1.651 m (5' 5\")  Wt 81 kg (178 lb 9.6 oz)  BMI 29.72 kg/m2    General: Awake, alert, oriented. Well nourished, well developed, he is not in any acute distress.  Incisions: Lumbar spine midline incision from L1 to S1 well healed.    Neurological  Awake, alert and oriented to date, time, place and person. Speech fluent.   Pupils equal, round, reactive to light.  Extraocular movement intact.  Facial sensation intact.  Face symmetric.  Moves all extremities well.  Sensation grossly intact.      ASSESSMENT  51 year old male with history of chronic cervical and lumbar pain. S/p multiple cervical and lumbar spine surgeries.    He returns to discuss his options further. We do not have his neuropsychological report and although he had his imaging done, we do not have it to review.    We again discussed his option of the intrathecal pump placement.  I again expressed my concerns for this option.  The main concern is that he lives near the [facility]. If we were to " "place a pump, his management would be difficult, as he would have to make frequent trips down to [place] for adjustments and refills.  Although he states that he is willing to do so, I question the feasibility and the limited options of days that he can be seen.    I also discussed the overall pain management service issues.  Unless he has a pain management physician or service who is willing to manage his pump and his medication, I told him that it would be difficult for me to take him on as a patient to continue to manage. I explained to him the differences in the role of an implanting surgeon versus a pain management physician.  I also explained that I do have some patients that I manage but that is due to the lack of pain management service at this time who can take over the management.  Adding to the fact that he would be a \"long distance\" patient, I explained that it would not be a good idea or a safe idea to go forward with the implantation.    I explained that if he is able to establish a relationship with a pain management physician close to his home and he requires the surgical services of implanting the pump, I could be available for this.      PLAN  1. MRI of the cervical and thoracic spine - either obtain or get the images.  2. We will await for neuropsych report.  3. Awaiting other medical records.  4. Patient will let us know if he has established a relationship with a pain management physician near his home who is willing to manage a intrathecal pump.    I, Solange Sousa, am serving as a scribe to document services personally performed by Cisco Benson MD, PhD, based upon my observations and the provider's statements to me. All documentation has been reviewed and edited by the aforementioned doctor prior to being entered into the official medical record.    I, Cisco Benson, attest that above named individual is acting in scribe capacity, has observed my performance of the services and has " documented them in accordance with my direction. The documentation recorded by the scribe accurately reflects the service I personally performed and the decisions made by me. The document was also partially recorded by me and the entire document was edited by me as well.

## 2017-03-13 NOTE — MR AVS SNAPSHOT
"              After Visit Summary   3/13/2017    Jani Ibarra    MRN: [id number]           Patient Information     Date Of Birth          1965        Visit Information        Provider Department      3/13/2017 3:00 PM Cisco Benson MD [facility] Neurosurgery        Today's Diagnoses     Cervicalgia    -  1    Chronic bilateral thoracic back pain           Follow-ups after your visit        Who to contact     Please call your clinic at [phone] to:    Ask questions about your health    Make or cancel appointments    Discuss your medicines    Learn about your test results    Speak to your doctor   If you have compliments or concerns about an experience at your clinic, or if you wish to file a complaint, please contact [facility] Patient Relations at [phone] or email us at [email or web address]         Additional Information About Your Visit        MyChart Information     [employer] is an electronic gateway that provides easy, online access to your medical records. With [employer], you can request a clinic appointment, read your test results, renew a prescription or communicate with your care team.     To sign up for [employer] visit the website at [email or web address]   You will be asked to enter the access code listed below, as well as some personal information. Please follow the directions to create your username and password.     Your access code is: [email or web address]  Expires: 2017 11:15 AM     Your access code will  in 90 days. If you need help or a new code, please contact your [facility] or call [phone] for assistance.        Care EveryWhere ID     This is your Care EveryWhere ID. This could be used by other organizations to access your [place] medical records  [id number]        Your Vitals Were     Pulse Height BMI (Body Mass Index)             61 1.651 m (5' 5\") 29.72 kg/m2          Blood Pressure from Last " 3 Encounters:   03/13/17 (!) 177/97   01/09/17 109/43    Weight from Last 3 Encounters:   03/13/17 81 kg (178 lb 9.6 oz)   01/09/17 76.3 kg (168 lb 4.8 oz)               Primary Care Provider    None Specified       No primary provider on file.        Thank you!     Thank you for choosing [facility]  for your care. Our goal is always to provide you with excellent care. Hearing back from our patients is one way we can continue to improve our services. Please take a few minutes to complete the written survey that you may receive in the mail after your visit with us. Thank you!             Your Updated Medication List - Protect others around you: Learn how to safely use, store and throw away your medicines at www.disposemymeds.org.          This list is accurate as of: 3/13/17 11:59 PM.  Always use your most recent med list.                   Brand Name Dispense Instructions for use    MELOXICAM PO      Take 7.5 mg by mouth 2 times daily       MIRTAZAPINE PO      Take 30 mg by mouth At Bedtime       OLANZAPINE PO      Take 5 mg by mouth At Bedtime       OMEPRAZOLE PO      Take 20 mg by mouth 2 times daily       OXYCODONE HCL PO          oxyCODONE-acetaminophen 5-325 MG per tablet    PERCOCET     Take 2.5 tablets by mouth every 4 hours as needed for moderate to severe pain       PRAMIPEXOLE DIHYDROCHLORIDE PO      Take 0.25 mg by mouth 2 times daily       PREDNISONE PO      Take 10 mg by mouth daily       PREGABALIN PO      Take 300 mg by mouth 2 times daily       QUETIAPINE FUMARATE PO      Take 50 mg by mouth At Bedtime       RANITIDINE HCL PO      Take 150 mg by mouth daily       SIMVASTATIN PO      Take 40 mg by mouth At Bedtime       TAMSULOSIN HCL PO      Take 0.8 mg by mouth daily       vitamin E 400 UNIT capsule      Take by mouth every morning

## 2020-11-02 NOTE — NURSING NOTE
Chief Complaint   Patient presents with     RECHECK     [facility] RETURN - Discuss Pump Pain     Cici Davila MA    
Admission